# Patient Record
(demographics unavailable — no encounter records)

---

## 2025-07-25 NOTE — DISCUSSION/SUMMARY
[de-identified] : 66 y/o male right shoulder pain.  The patient presents for evaluation of degenerative shoulder pain and stiffness. 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 Described that this is likely due to overuse, repeated injury, and/or age-related "wear and tear". Pain may be related to breakdown of the rotator cuff tendons, irritation and/or breakdown of the long head of the biceps tendon, and/or concurrent bursitis. Radiographs show minimal glenohumeral degenerative changes. Future treatment may include use of advanced imaging to determine degree of internal derangement and arthrosis, injection therapy (cortisone vs. visco-supplementation), as well as potential role for surgical treatment (arthroscopy vs. arthroplasty).  Recommendation: Begin a trial of PT. Rx given. Conservative care & observation; including rest/activity avoidance until less symptomatic with avoidance of overhead repeated activity. Patient may also use OTC NSAID's, or acetaminophen as tolerated, with application of ice to the area 2-3x daily for 20 minutes after periods of activity.   Follow up as needed if pain persists for further evaluation and treatment

## 2025-07-25 NOTE — DISCUSSION/SUMMARY
[de-identified] : 66 y/o male right shoulder pain.  The patient presents for evaluation of degenerative shoulder pain and stiffness. 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 Described that this is likely due to overuse, repeated injury, and/or age-related "wear and tear". Pain may be related to breakdown of the rotator cuff tendons, irritation and/or breakdown of the long head of the biceps tendon, and/or concurrent bursitis. Radiographs show minimal glenohumeral degenerative changes. Future treatment may include use of advanced imaging to determine degree of internal derangement and arthrosis, injection therapy (cortisone vs. visco-supplementation), as well as potential role for surgical treatment (arthroscopy vs. arthroplasty).  Recommendation: Begin a trial of PT. Rx given. Conservative care & observation; including rest/activity avoidance until less symptomatic with avoidance of overhead repeated activity. Patient may also use OTC NSAID's, or acetaminophen as tolerated, with application of ice to the area 2-3x daily for 20 minutes after periods of activity.   Follow up as needed if pain persists for further evaluation and treatment

## 2025-07-25 NOTE — HISTORY OF PRESENT ILLNESS
[de-identified] : 65-year-old RHD male who presents with 2-3 months of right shoulder pain. No injury or trauma reported. He felt pain after reaching to pick something up off the ground. Patient has been doing chiropractic treatments 3x/week and taking Advil as needed. He states he is at least 80 % better from this. His chief complaint today is residual stiffness.  The patient's past medical history, past surgical history, medications and allergies were reviewed by me today with the patient and documented accordingly. In addition, the patient's family and social history, which were noncontributory to this visit were reviewed also.

## 2025-07-25 NOTE — PHYSICAL EXAM
[de-identified] : Right shoulder exam  Inspection: No malalignment, No defects, No atrophy Skin: No masses, No lesions Neck: Negative Spurling's, full ROM, no pain with ROM AROM: FF to 110, abduction to 70, ER to 20, IR to low lumbar Painful arc ROM: IR Tenderness: no bicipital tenderness, no tenderness to the greater tuberosity/RTC insertion, no anterior shoulder/lesser tuberosity tenderness Strength: 5/5 ER, 5/5 IR in adduction, 4/5 supraspinatus testing, negative [place]'s test AC Joint: No ttp/pain with cross arm testing Biceps: Speed Negative, [place] Negative Impingement test: +Lange, +Neer  Stability: Stable Vasc: 2+ radial pulse Neuro: AIN, PIN, Ulnar nerve intact to motor Sensation: Intact to light touch throughout  [de-identified] : The following radiographs were ordered and read by me during this patients visit. I reviewed each radiograph in detail with the patient and discussed the findings as highlighted below.   3 views of the right shoulder were obtained, 07/24/2025, that show no acute fracture or dislocation. There is minimal glenohumeral and no AC joint degenerative change seen. Type II acromion. There is no significant malalignment. No significant other obvious osseous abnormality, otherwise unremarkable.

## 2025-07-25 NOTE — HISTORY OF PRESENT ILLNESS
[de-identified] : 65-year-old RHD male who presents with 2-3 months of right shoulder pain. No injury or trauma reported. He felt pain after reaching to pick something up off the ground. Patient has been doing chiropractic treatments 3x/week and taking Advil as needed. He states he is at least 80 % better from this. His chief complaint today is residual stiffness.  The patient's past medical history, past surgical history, medications and allergies were reviewed by me today with the patient and documented accordingly. In addition, the patient's family and social history, which were noncontributory to this visit were reviewed also.

## 2025-07-25 NOTE — PHYSICAL EXAM
[de-identified] : Right shoulder exam  Inspection: No malalignment, No defects, No atrophy Skin: No masses, No lesions Neck: Negative Spurling's, full ROM, no pain with ROM AROM: FF to 110, abduction to 70, ER to 20, IR to low lumbar Painful arc ROM: IR Tenderness: no bicipital tenderness, no tenderness to the greater tuberosity/RTC insertion, no anterior shoulder/lesser tuberosity tenderness Strength: 5/5 ER, 5/5 IR in adduction, 4/5 supraspinatus testing, negative [place]'s test AC Joint: No ttp/pain with cross arm testing Biceps: Speed Negative, [place] Negative Impingement test: +Lange, +Neer  Stability: Stable Vasc: 2+ radial pulse Neuro: AIN, PIN, Ulnar nerve intact to motor Sensation: Intact to light touch throughout  [de-identified] : The following radiographs were ordered and read by me during this patients visit. I reviewed each radiograph in detail with the patient and discussed the findings as highlighted below.   3 views of the right shoulder were obtained, 07/24/2025, that show no acute fracture or dislocation. There is minimal glenohumeral and no AC joint degenerative change seen. Type II acromion. There is no significant malalignment. No significant other obvious osseous abnormality, otherwise unremarkable.

## 2025-07-25 NOTE — ADDENDUM
[[email or web address]] : This note was written by Sarah Conde on 07/24/2025 acting solely as a scribe for Dr. Kalen Pandey.   All medical record entries made by the Scribe were at my, Dr. Kalen Pandey, direction and personally dictated by me on 07/24/2025. I have personally reviewed the chart and agree that the record accurately reflects my personal performance of the history, physical exam, assessment and plan.